# Patient Record
Sex: FEMALE | Race: WHITE
[De-identification: names, ages, dates, MRNs, and addresses within clinical notes are randomized per-mention and may not be internally consistent; named-entity substitution may affect disease eponyms.]

---

## 2020-01-15 ENCOUNTER — HOSPITAL ENCOUNTER (INPATIENT)
Dept: HOSPITAL 93 - ER | Age: 75
LOS: 4 days | Discharge: HOME | DRG: 330 | End: 2020-01-19
Attending: COLON & RECTAL SURGERY | Admitting: COLON & RECTAL SURGERY
Payer: COMMERCIAL

## 2020-01-15 VITALS — BODY MASS INDEX: 2.16 KG/M2 | WEIGHT: 11.02 LBS | HEIGHT: 60 IN

## 2020-01-15 DIAGNOSIS — C78.7: ICD-10-CM

## 2020-01-15 DIAGNOSIS — K62.5: ICD-10-CM

## 2020-01-15 DIAGNOSIS — K56.690: ICD-10-CM

## 2020-01-15 DIAGNOSIS — D64.89: ICD-10-CM

## 2020-01-15 DIAGNOSIS — C19: Primary | ICD-10-CM

## 2020-01-15 PROCEDURE — 0DJD8ZZ INSPECTION OF LOWER INTESTINAL TRACT, VIA NATURAL OR ARTIFICIAL OPENING ENDOSCOPIC: ICD-10-PCS | Performed by: COLON & RECTAL SURGERY

## 2020-01-15 PROCEDURE — 07TB4ZZ RESECTION OF MESENTERIC LYMPHATIC, PERCUTANEOUS ENDOSCOPIC APPROACH: ICD-10-PCS | Performed by: COLON & RECTAL SURGERY

## 2020-01-15 PROCEDURE — 0FB04ZZ EXCISION OF LIVER, PERCUTANEOUS ENDOSCOPIC APPROACH: ICD-10-PCS | Performed by: COLON & RECTAL SURGERY

## 2020-01-15 PROCEDURE — 0DTN4ZZ RESECTION OF SIGMOID COLON, PERCUTANEOUS ENDOSCOPIC APPROACH: ICD-10-PCS | Performed by: COLON & RECTAL SURGERY

## 2020-01-15 NOTE — NUR
PTE ALERTA Y ORIENTADA X3, PTE REFIERE REFERIDO DE DR.REYES PARA INTERVENCION
QUIRURGICA.PTE REFIERE MOLESTIA ABDOMINAL.

## 2020-02-10 ENCOUNTER — HOSPITAL ENCOUNTER (OUTPATIENT)
Dept: HOSPITAL 93 - CIR.AMB | Age: 75
Discharge: HOME | End: 2020-02-10
Attending: COLON & RECTAL SURGERY
Payer: COMMERCIAL

## 2020-02-10 DIAGNOSIS — C19: Primary | ICD-10-CM

## 2020-02-10 PROCEDURE — 36561 INSERT TUNNELED CV CATH: CPT
